# Patient Record
Sex: FEMALE | Race: WHITE | NOT HISPANIC OR LATINO | Employment: FULL TIME | URBAN - METROPOLITAN AREA
[De-identification: names, ages, dates, MRNs, and addresses within clinical notes are randomized per-mention and may not be internally consistent; named-entity substitution may affect disease eponyms.]

---

## 2021-02-19 LAB
HUMAN PAPILLOMAVIRUS (HPV): NORMAL
PAP RECOMMENDATION EXT: NORMAL
PAP SMEAR: NORMAL

## 2021-11-01 ENCOUNTER — HISTORICAL (OUTPATIENT)
Dept: RADIOLOGY | Facility: HOSPITAL | Age: 51
End: 2021-11-01

## 2021-11-01 LAB — BMD RECOMMENDATION EXT: NORMAL

## 2022-04-11 ENCOUNTER — HISTORICAL (OUTPATIENT)
Dept: ADMINISTRATIVE | Facility: HOSPITAL | Age: 52
End: 2022-04-11
Payer: COMMERCIAL

## 2022-04-28 VITALS
HEIGHT: 66 IN | OXYGEN SATURATION: 96 % | BODY MASS INDEX: 33.8 KG/M2 | DIASTOLIC BLOOD PRESSURE: 83 MMHG | WEIGHT: 210.31 LBS | SYSTOLIC BLOOD PRESSURE: 134 MMHG

## 2022-12-12 ENCOUNTER — DOCUMENTATION ONLY (OUTPATIENT)
Dept: INTERNAL MEDICINE | Facility: CLINIC | Age: 52
End: 2022-12-12
Payer: COMMERCIAL

## 2023-03-31 ENCOUNTER — OFFICE VISIT (OUTPATIENT)
Dept: GYNECOLOGY | Facility: CLINIC | Age: 53
End: 2023-03-31
Payer: COMMERCIAL

## 2023-03-31 VITALS
WEIGHT: 205 LBS | SYSTOLIC BLOOD PRESSURE: 119 MMHG | DIASTOLIC BLOOD PRESSURE: 78 MMHG | TEMPERATURE: 98 F | HEART RATE: 61 BPM | OXYGEN SATURATION: 100 % | HEIGHT: 66 IN | RESPIRATION RATE: 20 BRPM | BODY MASS INDEX: 32.95 KG/M2

## 2023-03-31 DIAGNOSIS — Z01.419 ROUTINE GYNECOLOGICAL EXAMINATION: Primary | ICD-10-CM

## 2023-03-31 PROCEDURE — 3008F PR BODY MASS INDEX (BMI) DOCUMENTED: ICD-10-PCS | Mod: CPTII,,, | Performed by: OBSTETRICS & GYNECOLOGY

## 2023-03-31 PROCEDURE — 3078F PR MOST RECENT DIASTOLIC BLOOD PRESSURE < 80 MM HG: ICD-10-PCS | Mod: CPTII,,, | Performed by: OBSTETRICS & GYNECOLOGY

## 2023-03-31 PROCEDURE — 1159F PR MEDICATION LIST DOCUMENTED IN MEDICAL RECORD: ICD-10-PCS | Mod: CPTII,,, | Performed by: OBSTETRICS & GYNECOLOGY

## 2023-03-31 PROCEDURE — 1159F MED LIST DOCD IN RCRD: CPT | Mod: CPTII,,, | Performed by: OBSTETRICS & GYNECOLOGY

## 2023-03-31 PROCEDURE — 1160F RVW MEDS BY RX/DR IN RCRD: CPT | Mod: CPTII,,, | Performed by: OBSTETRICS & GYNECOLOGY

## 2023-03-31 PROCEDURE — 99396 PR PREVENTIVE VISIT,EST,40-64: ICD-10-PCS | Mod: S$PBB,,, | Performed by: OBSTETRICS & GYNECOLOGY

## 2023-03-31 PROCEDURE — 3078F DIAST BP <80 MM HG: CPT | Mod: CPTII,,, | Performed by: OBSTETRICS & GYNECOLOGY

## 2023-03-31 PROCEDURE — 3074F SYST BP LT 130 MM HG: CPT | Mod: CPTII,,, | Performed by: OBSTETRICS & GYNECOLOGY

## 2023-03-31 PROCEDURE — 1160F PR REVIEW ALL MEDS BY PRESCRIBER/CLIN PHARMACIST DOCUMENTED: ICD-10-PCS | Mod: CPTII,,, | Performed by: OBSTETRICS & GYNECOLOGY

## 2023-03-31 PROCEDURE — 99214 OFFICE O/P EST MOD 30 MIN: CPT | Mod: PBBFAC | Performed by: OBSTETRICS & GYNECOLOGY

## 2023-03-31 PROCEDURE — 3008F BODY MASS INDEX DOCD: CPT | Mod: CPTII,,, | Performed by: OBSTETRICS & GYNECOLOGY

## 2023-03-31 PROCEDURE — 3074F PR MOST RECENT SYSTOLIC BLOOD PRESSURE < 130 MM HG: ICD-10-PCS | Mod: CPTII,,, | Performed by: OBSTETRICS & GYNECOLOGY

## 2023-03-31 PROCEDURE — 99396 PREV VISIT EST AGE 40-64: CPT | Mod: S$PBB,,, | Performed by: OBSTETRICS & GYNECOLOGY

## 2023-03-31 RX ORDER — ALPRAZOLAM 0.5 MG/1
TABLET ORAL
COMMUNITY

## 2023-03-31 RX ORDER — METAXALONE 800 MG/1
800 TABLET ORAL
COMMUNITY
Start: 2023-02-14

## 2023-03-31 RX ORDER — DIAZEPAM 10 MG/1
10 TABLET ORAL NIGHTLY PRN
COMMUNITY
Start: 2022-12-09

## 2023-03-31 RX ORDER — HYDROCODONE BITARTRATE AND ACETAMINOPHEN 5; 325 MG/1; MG/1
1 TABLET ORAL EVERY 6 HOURS PRN
COMMUNITY
Start: 2022-12-09

## 2023-03-31 NOTE — LETTER
March 31, 2023      [facility]  [address] [facility] [address]  Phone: [phone]       Patient: Kiara Valladares   YOB: 1970  Date of Visit: 03/31/2023    To Whom It May Concern:    Joanie Valladares  was at [facility] on 03/31/2023. The patient may return to work. If you have any questions or concerns, or if I can be of further assistance, please do not hesitate to contact me.    Sincerely,    Ruiz Poole MD

## 2023-03-31 NOTE — PROGRESS NOTES
Subjective:       Patient ID: Kiara Valladares is a 52 y.o. female.    Chief Complaint:  No chief complaint on file.    History of Present Illness:  Presents for gyn wellness visit without complaints.  Periods are regular without excessive bleeding or pain. She denies abnormal bleeding, vaginal discharge, breast masses or other changes of concern.  Mammogram was recently done.    GYN & OB History  Patient's last menstrual period was 2023 (approximate).   Date of Last Pap: 2021    OB History    Para Term  AB Living   5 3 3   2 3   SAB IAB Ectopic Multiple Live Births     2            # Outcome Date GA Lbr Og/2nd Weight Sex Delivery Anes PTL Lv   5 Term      Vag-Spont      4 Term      Vag-Spont      3 IAB            2 IAB            1 Term      Vag-Spont          History reviewed. No pertinent past medical history.   Past Surgical History:   Procedure Laterality Date    BREAST LUMPECTOMY      COLONOSCOPY N/A 10/31/2022    TUBAL LIGATION          Current Outpatient Medications:     ALPRAZolam (XANAX) 0.5 MG tablet, alprazolam 0.5 mg tablet  TAKE 1/2 TABLET EVERY 12 HOURS AS NEEDED, Disp: , Rfl:     diazePAM (VALIUM) 10 MG Tab, Take 10 mg by mouth nightly as needed., Disp: , Rfl:     HYDROcodone-acetaminophen (NORCO) 5-325 mg per tablet, Take 1 tablet by mouth every 6 (six) hours as needed., Disp: , Rfl:     metaxalone (SKELAXIN) 800 MG tablet, Take 800 mg by mouth., Disp: , Rfl:      Review of patient's allergies indicates:   Allergen Reactions    Latex, natural rubber Hives, Rash and Swelling        Social History     Socioeconomic History    Marital status:    Tobacco Use    Smoking status: Former     Types: Cigarettes    Smokeless tobacco: Former   Substance and Sexual Activity    Alcohol use: Yes     Comment: once a month    Drug use: Never    Sexual activity: Not Currently     Birth control/protection: See Surgical Hx          There is no immunization history on file for this  patient.     There are no preventive care reminders to display for this patient.     Review of Systems  Review of Systems       Objective:     Vitals:    03/31/23 1022   BP: 119/78   Pulse: 61   Resp: 20   Temp: 98.4 °F (36.9 °C)       Physical Exam:   Constitutional: She is oriented to person, place, and time. She appears well-developed and well-nourished.    HENT:   Head: Normocephalic.    Eyes: Pupils are equal, round, and reactive to light. EOM are normal.    Neck: No thyromegaly present.    Cardiovascular:  Normal rate, regular rhythm and normal heart sounds.            No murmur heard.   Pulmonary/Chest: Effort normal and breath sounds normal. Right breast exhibits no mass, no nipple discharge, no skin change and no tenderness. Left breast exhibits no mass, no nipple discharge, no skin change and no tenderness. Breasts are symmetrical.        Abdominal: Soft. She exhibits no distension. There is no abdominal tenderness.     Genitourinary:    Vagina and uterus normal.   The external female genitalia was normal.   Labial bartholins normal.There is no lesion on the right labia. There is no lesion on the left labia. Cervix is normal. Right adnexum displays no mass and no tenderness. Left adnexum displays no mass and no tenderness. Vagina exhibits no lesion. No erythema, rectocele, cystocele or unspecified prolapse of vaginal walls in the vagina. Cervix exhibits no lesion, no discharge, no friability, no lesion and no tenderness. Uerus contour normal  Uterus is not fixed. Normal urethral meatus.Bladder findings: no bladder tenderness          Musculoskeletal: Normal range of motion. No edema.      Lymphadenopathy:     She has no cervical adenopathy.    Neurological: She is alert and oriented to person, place, and time.    Skin: Skin is warm and dry. No rash noted.    Psychiatric: She has a normal mood and affect.        Assessment:        1. Routine gynecological examination                Plan:      Problem List  Items Addressed This Visit    None  Visit Diagnoses       Routine gynecological examination    -  Primary               Follow up in 1 year (on 3/31/2024).   SBE

## 2024-01-11 ENCOUNTER — TELEPHONE (OUTPATIENT)
Dept: GYNECOLOGY | Facility: CLINIC | Age: 54
End: 2024-01-11
Payer: COMMERCIAL

## 2024-01-11 DIAGNOSIS — Z12.31 SCREENING MAMMOGRAM FOR BREAST CANCER: Primary | ICD-10-CM

## 2024-01-11 NOTE — TELEPHONE ENCOUNTER
Do not see active order for mammogram in patients chart. Order placed.     Last annual: 03/2023  Next annual: 04/2024    Will inform patient that order was placed and faxed to [facility]. Fax confirmation will be scanned into patients chart for reference.     ----- Message from More Bermudez sent at 1/10/2024  2:43 PM CST -----  Above pt called requesting orders for a mammogram. Pt states she has an appt on 2/26 w/ the breast center [facility], Pt states she was told to call for orders. Please advise, Thanks!

## 2024-06-21 ENCOUNTER — OFFICE VISIT (OUTPATIENT)
Dept: GYNECOLOGY | Facility: CLINIC | Age: 54
End: 2024-06-21
Payer: COMMERCIAL

## 2024-06-21 VITALS
BODY MASS INDEX: 31.34 KG/M2 | RESPIRATION RATE: 18 BRPM | SYSTOLIC BLOOD PRESSURE: 119 MMHG | HEIGHT: 66 IN | WEIGHT: 195 LBS | OXYGEN SATURATION: 100 % | HEART RATE: 58 BPM | DIASTOLIC BLOOD PRESSURE: 79 MMHG | TEMPERATURE: 98 F

## 2024-06-21 DIAGNOSIS — Z01.419 ROUTINE GYNECOLOGICAL EXAMINATION: Primary | ICD-10-CM

## 2024-06-21 DIAGNOSIS — Z12.31 VISIT FOR SCREENING MAMMOGRAM: ICD-10-CM

## 2024-06-21 PROCEDURE — 99214 OFFICE O/P EST MOD 30 MIN: CPT | Mod: PBBFAC | Performed by: OBSTETRICS & GYNECOLOGY

## 2024-06-21 RX ORDER — MELOXICAM 15 MG/1
15 TABLET ORAL
COMMUNITY
Start: 2024-01-10 | End: 2024-06-21

## 2024-06-21 RX ORDER — FLUTICASONE PROPIONATE 93 UG/1
2 SPRAY, METERED NASAL
COMMUNITY
Start: 2024-04-16

## 2024-06-21 NOTE — PROGRESS NOTES
Subjective:       Patient ID: Kiara Valladares is a 53 y.o. female.    Chief Complaint:  Well Woman    History of Present Illness:  Presents for gyn wellness visit without complaints.  Periods are regular without excessive bleeding or pain. She denies intermenstrual bleeding, vaginal discharge, breast masses or other changes of concern. MMG normal 24. Denies menopausal symptoms.    GYN & OB History  Patient's last menstrual period was 06/10/2024 (exact date).   Date of Last Pap: 2021    OB History    Para Term  AB Living   5 3 3   2 3   SAB IAB Ectopic Multiple Live Births     2            # Outcome Date GA Lbr Og/2nd Weight Sex Type Anes PTL Lv   5 Term      Vag-Spont      4 Term      Vag-Spont      3 IAB            2 IAB            1 Term      Vag-Spont          History reviewed. No pertinent past medical history.   Past Surgical History:   Procedure Laterality Date    BREAST LUMPECTOMY      COLONOSCOPY N/A 10/31/2022    TUBAL LIGATION          Current Outpatient Medications:     ALPRAZolam (XANAX) 0.5 MG tablet, alprazolam 0.5 mg tablet  TAKE 1/2 TABLET EVERY 12 HOURS AS NEEDED, Disp: , Rfl:     diazePAM (VALIUM) 10 MG Tab, Take 10 mg by mouth nightly as needed., Disp: , Rfl:     HYDROcodone-acetaminophen (NORCO) 5-325 mg per tablet, Take 1 tablet by mouth every 6 (six) hours as needed., Disp: , Rfl:     metaxalone (SKELAXIN) 800 MG tablet, Take 800 mg by mouth., Disp: , Rfl:     XHANCE 93 mcg/actuation AerB, 2 sprays by Each Nostril route., Disp: , Rfl:      Review of patient's allergies indicates:   Allergen Reactions    Latex, natural rubber Hives, Rash and Swelling        Social History     Socioeconomic History    Marital status:    Tobacco Use    Smoking status: Former     Types: Cigarettes    Smokeless tobacco: Former   Substance and Sexual Activity    Alcohol use: Yes     Comment: once a month    Drug use: Never    Sexual activity: Not Currently     Partners: Male     Birth  control/protection: See Surgical Hx          There is no immunization history on file for this patient.     Health Maintenance Due   Topic Date Due    Tetanus Vaccine  Never done    Shingles Vaccine (1 of 2) Never done    COVID-19 Vaccine (1 - 2023-24 season) Never done        Review of Systems  Review of Systems       Objective:     Vitals:    06/21/24 1027   BP: 119/79   Pulse: (!) 58   Resp: 18   Temp: 97.9 °F (36.6 °C)       Physical Exam:   Constitutional: She is oriented to person, place, and time. She appears well-developed and well-nourished.    HENT:   Head: Normocephalic.    Eyes: Pupils are equal, round, and reactive to light. EOM are normal.    Neck: No thyromegaly present.    Cardiovascular:  Normal rate, regular rhythm and normal heart sounds.            No murmur heard.   Pulmonary/Chest: Effort normal and breath sounds normal. Right breast exhibits no mass, no nipple discharge, no skin change and no tenderness. Left breast exhibits no mass, no nipple discharge, no skin change and no tenderness. Breasts are symmetrical.        Abdominal: Soft. She exhibits no distension. There is no abdominal tenderness.     Genitourinary:    Vagina and uterus normal.   The external female genitalia was normal.     Labial bartholins normal.There is no lesion on the right labia. There is no lesion on the left labia. Cervix is normal. Right adnexum displays no mass and no tenderness. Left adnexum displays no mass and no tenderness. Vagina exhibits no lesion. No erythema, rectocele, cystocele or prolapse of vaginal walls in the vagina. Cervix exhibits no lesion, no discharge, no friability and no tenderness. Uerus contour normal  Uterus is not fixed. Normal urethral meatus.Bladder findings: no bladder tenderness          Musculoskeletal: Normal range of motion. No edema.      Lymphadenopathy:     She has no cervical adenopathy.    Neurological: She is alert and oriented to person, place, and time.    Skin: Skin is warm  and dry. No rash noted.    Psychiatric: She has a normal mood and affect.          Assessment:        1. Routine gynecological examination    2. Visit for screening mammogram                Plan:      Problem List Items Addressed This Visit    None  Visit Diagnoses       Routine gynecological examination    -  Primary    Relevant Orders    Liquid-Based Pap Smear, Screening    Visit for screening mammogram        Relevant Orders    Mammo Digital Screening Bilat w/ [place]               Follow up in about 1 year (around 6/21/2025) for GYN Wellness.   SBE

## 2024-06-25 ENCOUNTER — TELEPHONE (OUTPATIENT)
Dept: GYNECOLOGY | Facility: CLINIC | Age: 54
End: 2024-06-25
Payer: COMMERCIAL

## 2024-06-25 NOTE — TELEPHONE ENCOUNTER
Called and informed. Patient verbalized understanding.     ----- Message from Ruiz Poole MD sent at 6/25/2024  3:10 PM CDT -----  Please notify patient of normal pap.